# Patient Record
Sex: MALE | Race: WHITE | NOT HISPANIC OR LATINO | Employment: UNEMPLOYED | URBAN - METROPOLITAN AREA
[De-identification: names, ages, dates, MRNs, and addresses within clinical notes are randomized per-mention and may not be internally consistent; named-entity substitution may affect disease eponyms.]

---

## 2018-01-27 ENCOUNTER — APPOINTMENT (OUTPATIENT)
Dept: RADIOLOGY | Facility: MEDICAL CENTER | Age: 48
End: 2018-01-27
Attending: EMERGENCY MEDICINE

## 2018-01-27 ENCOUNTER — HOSPITAL ENCOUNTER (EMERGENCY)
Facility: MEDICAL CENTER | Age: 48
End: 2018-01-27
Attending: EMERGENCY MEDICINE

## 2018-01-27 VITALS
TEMPERATURE: 98.6 F | HEIGHT: 68 IN | HEART RATE: 94 BPM | OXYGEN SATURATION: 97 % | BODY MASS INDEX: 25.01 KG/M2 | DIASTOLIC BLOOD PRESSURE: 93 MMHG | WEIGHT: 165 LBS | SYSTOLIC BLOOD PRESSURE: 136 MMHG | RESPIRATION RATE: 15 BRPM

## 2018-01-27 DIAGNOSIS — M25.512 ACUTE PAIN OF BOTH SHOULDERS: ICD-10-CM

## 2018-01-27 DIAGNOSIS — S46.919A STRAIN OF SHOULDER, UNSPECIFIED LATERALITY, INITIAL ENCOUNTER: ICD-10-CM

## 2018-01-27 DIAGNOSIS — M25.511 ACUTE PAIN OF BOTH SHOULDERS: ICD-10-CM

## 2018-01-27 PROCEDURE — 73030 X-RAY EXAM OF SHOULDER: CPT | Mod: RT

## 2018-01-27 PROCEDURE — 99284 EMERGENCY DEPT VISIT MOD MDM: CPT

## 2018-01-27 PROCEDURE — 73030 X-RAY EXAM OF SHOULDER: CPT | Mod: LT

## 2018-01-27 ASSESSMENT — PAIN SCALES - GENERAL: PAINLEVEL_OUTOF10: 7

## 2018-01-27 ASSESSMENT — LIFESTYLE VARIABLES: DO YOU DRINK ALCOHOL: NO

## 2018-01-27 NOTE — ED NOTES
Pt ambulatory at discharge with Dario MERRITT. Verbalizes understanding of all discharge and follow up instructions.
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
## 2018-01-27 NOTE — DISCHARGE INSTRUCTIONS
Medically clear for incarceration    Joint Sprain  A sprain is a tear or stretch in the ligaments that hold a joint together. Severe sprains may need as long as 3-6 weeks of immobilization and/or exercises to heal completely. Sprained joints should be rested and protected. If not, they can become unstable and prone to re-injury. Proper treatment can reduce your pain, shorten the period of disability, and reduce the risk of repeated injuries.  TREATMENT   · Rest and elevate the injured joint to reduce pain and swelling.  · Apply ice packs to the injury for 20-30 minutes every 2-3 hours for the next 2-3 days.  · Keep the injury wrapped in a compression bandage or splint as long as the joint is painful or as instructed by your caregiver.  · Do not use the injured joint until it is completely healed to prevent re-injury and chronic instability. Follow the instructions of your caregiver.  · Long-term sprain management may require exercises and/or treatment by a physical therapist. Taping or special braces may help stabilize the joint until it is completely better.  SEEK MEDICAL CARE IF:   · You develop increased pain or swelling of the joint.  · You develop increasing redness and warmth of the joint.  · You develop a fever.  · It becomes stiff.  · Your hand or foot gets cold or numb.  Document Released: 01/25/2006 Document Revised: 03/11/2013 Document Reviewed: 01/04/2010  [employer]® Patient Information ©2014 [employer].